# Patient Record
Sex: MALE
[De-identification: names, ages, dates, MRNs, and addresses within clinical notes are randomized per-mention and may not be internally consistent; named-entity substitution may affect disease eponyms.]

---

## 2019-04-25 ENCOUNTER — HOSPITAL ENCOUNTER (EMERGENCY)
Dept: HOSPITAL 31 - C.ER | Age: 29
Discharge: HOME | End: 2019-04-25
Payer: SELF-PAY

## 2019-04-25 VITALS — OXYGEN SATURATION: 98 %

## 2019-04-25 VITALS
RESPIRATION RATE: 16 BRPM | DIASTOLIC BLOOD PRESSURE: 100 MMHG | SYSTOLIC BLOOD PRESSURE: 150 MMHG | HEART RATE: 91 BPM | TEMPERATURE: 98.8 F

## 2019-04-25 DIAGNOSIS — S82.402A: ICD-10-CM

## 2019-04-25 DIAGNOSIS — S82.142A: Primary | ICD-10-CM

## 2019-04-25 DIAGNOSIS — R03.0: ICD-10-CM

## 2019-04-25 DIAGNOSIS — Z99.3: ICD-10-CM
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
## 2019-04-26 NOTE — CT
CT left knee 



HISTORY:

Tibia and fibula fractures. 



COMPARISON:

X-ray dated 04/25/2019 



TECHNIQUE:

Multiple contiguous axial images were performed through the left knee 

without the use of intravenous contrast.  Subsequently, sagittal and 

coronal reformatted images were obtained. 



FINDINGS:

Prominent fracture deformities of the proximal tibia.  At the 

posterior aspect of the proximal tibia, there is a long vertically 

oriented intra-articular fracture deformity which extends 

approximately 9.2 centimeters in craniocaudal dimension from the 

midline posterior tibial plateau inferiorly to the proximal medullary 

cavity.  At the proximal tibia posteriorly, the fracture appears to 

extend in an anteromedial fashion along the tibial plateau along the 

articular surface to the level of the tibial spines.  More inferiorly,

 along the posterior cortex, at the level of the metadiaphysis, the 

fracture extends both medially and laterally disrupting the posterior 

cortex with associated mild comminution best demonstrated on series 2,

 image 41 as well as series 602 images 26 through 48. 



Mildly comminuted fracture deformity seen through the proximal fibula 

at the fibular head neck junction. 



Large suprapatellar joint effusion with layering hemarthrosis. 



Prominent reticulation and edema within the visualized musculature at 

the level of the proximal tibia and fibula. 



Diffuse osteopenia.  Heterogeneous attenuation of the osseous marrow. 

 This may be better evaluated with MRI. 



Postsurgical changes of the distal femur.



Impression: 



1. Prominent fracture deformities of the proximal tibia.  At the 

posterior aspect of the proximal tibia, there is a long vertically 

oriented intra-articular fracture deformity which extends 

approximately 9.2 centimeters in craniocaudal dimension from the 

midline posterior tibial plateau inferiorly to the proximal medullary 

cavity.  At the proximal tibia posteriorly, the fracture appears to 

extend in an anteromedial fashion along the tibial plateau along the 

articular surface to the level of the tibial spines.  More inferiorly,

 along the posterior cortex, at the level of the metadiaphysis, the 

fracture extends both medially and laterally disrupting the posterior 

cortex with associated mild comminution best demonstrated on series 2,

 image 41 as well as series 602 images 26 through 48. 



2. Mildly comminuted fracture deformity seen through the proximal 

fibula at the fibular head neck junction. 



3. Large suprapatellar joint effusion with layering hemarthrosis. 



4. Prominent reticulation and edema within the visualized musculature 

at the level of the proximal tibia and fibula. 



5. Diffuse osteopenia.  Heterogeneous attenuation of the osseous 

marrow.  This may be better evaluated with MRI.

## 2019-04-26 NOTE — RAD
Date of service: 



04/25/2019



PROCEDURE:  Left Knee Radiographs.



HISTORY:

Pain.



COMPARISON:

None.



TECHNIQUE:

2 views obtained.



FINDINGS:



BONES:

Status post ORIF mid femoral fracture with evidence of healing. There 

is an acute nondisplaced oblique fracture of the proximal tibia 

extending to the articular surface.  There is a transverse 

nondisplaced fracture of the proximal fibula. 



JOINTS:

Normal. No osteoarthritis. 



JOINT EFFUSION:

There is probable lipohemarthrosis in the suprapatellar bursa. 



OTHER FINDINGS:

None.



IMPRESSION:

Nondisplaced fractures of proximal tibia and fibula.